# Patient Record
Sex: FEMALE | NOT HISPANIC OR LATINO | Employment: FULL TIME | URBAN - METROPOLITAN AREA
[De-identification: names, ages, dates, MRNs, and addresses within clinical notes are randomized per-mention and may not be internally consistent; named-entity substitution may affect disease eponyms.]

---

## 2022-04-22 NOTE — PATIENT INSTRUCTIONS
Preventive Health Recommendations  Female Ages 26 - 39  Yearly exam:   See your health care provider every year in order to  Review health changes.   Discuss preventive care.    Review your medicines if you your doctor has prescribed any.    Until age 30: Get a Pap test every three years (more often if you have had an abnormal result).    After age 30: Talk to your doctor about whether you should have a Pap test every 3 years or have a Pap test with HPV screening every 5 years.   You do not need a Pap test if your uterus was removed (hysterectomy) and you have not had cancer.  You should be tested each year for STDs (sexually transmitted diseases), if you're at risk.   Talk to your provider about how often to have your cholesterol checked.  If you are at risk for diabetes, you should have a diabetes test (fasting glucose).  Shots: Get a flu shot each year. Get a tetanus shot every 10 years.   Nutrition:   Eat at least 5 servings of fruits and vegetables each day.  Eat whole-grain bread, whole-wheat pasta and brown rice instead of white grains and rice.  Get adequate Calcium and Vitamin D.     Lifestyle  Exercise at least 150 minutes a week (30 minutes a day, 5 days of the week). This will help you control your weight and prevent disease.  Limit alcohol to one drink per day.  No smoking.   Wear sunscreen to prevent skin cancer.  See your dentist every six months for an exam and cleaning.    Thank you for coming in today!     We are working to improve our digital reputation.  Would you please help us by reviewing our clinic on Google and/or Facebook?  These are links for filling out a review for the clinic:    [email or web address]                 [email or web address]    We truly appreciate you taking the time to do this.     General Information:    Today you had your appointment with Sylvia Lombardo CNP  My hours are:    Monday 8 AM - 5 PM  Wednesday: 8 AM - 5  PM  Thursday: 8 AM - 5 PM  Fridays: 8 AM - 5 PM    I am not in the office Tuesdays. Therefore non urgent calls received on Tuesday will be addressed when I am back in the office on Wednesday.     If lab work was done today as part of your evaluation you will generally be contacted via My Chart, mail, or phone with the results within 1-5 days. If there is an alarming result we will contact you by phone. Lab results come back at varying times, I generally wait until all labs are resulted before making comments on results. Please note labs are automatically released to My Chart once available.     If you need refills please contact your pharmacy They will send a refill request to me to review. Please allow 3 business days for us to process all refill requests.     Please call or send a medical message with any questions or concerns

## 2022-04-22 NOTE — PROGRESS NOTES
ts SUBJECTIVE:   CC: Brigitte Shi is an 37 year old woman who presents for preventive health visit.     Patient has been advised of split billing requirements and indicates understanding: Yes  Healthy Habits:    Do you get at least three servings of calcium containing foods daily (dairy, green leafy vegetables, etc.)? no    Amount of exercise or daily activities, outside of work: 0 day(s) per week. Stretches but no aerobic exercise.     Problems taking medications regularly No    Medication side effects: No    Have you had an eye exam in the past two years? yes    Do you see a dentist twice per year? no    Do you have sleep apnea, excessive snoring or daytime drowsiness?no    Going [facility]"" 4/30-6/3. Periods were regular but now irregular. LMP 4/21 lasted 4 days. Before that had been 2 months. Used to have heavy bleeding but now lighter. Does not track periods on [place]. Wants to get pregnant. 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     Had covid-19 infection 1/23/22. Not vaccinated. She and her  do not want to get vaccine. Also has not ever gotten influenza vaccine.    Cold intolerance of extremities.     Today's PHQ-2 Score:   PHQ-2 ( 1999 Pfizer) 4/25/2022   Q1: Little interest or pleasure in doing things 0   Q2: Feeling down, depressed or hopeless 0   PHQ-2 Score 0       Abuse: Current or Past(Physical, Sexual or Emotional)- No  Do you feel safe in your environment? Yes    Have you ever done Advance Care Planning? (For example, a Health Directive, POLST, or a discussion with a medical provider or your loved ones about your wishes): No, advance care planning information given to patient to review.  Patient plans to discuss their wishes with loved ones or provider.    Patient under 40 years of age: Routine Mammogram Screening not recommended.   Pertinent mammograms are reviewed under the imaging tab.    History of abnormal Pap smear: NO - age 30-65 PAP every 5 years with negative HPV  "co-testing recommended    ROS:  CONSTITUTIONAL: NEGATIVE for fever, chills, change in weight  INTEGUMENTARU/SKIN: NEGATIVE for worrisome rashes, moles or lesions  EYES: NEGATIVE for vision changes or irritation  ENT: NEGATIVE for ear, mouth and throat problems  RESP: NEGATIVE for significant cough or SOB  BREAST: NEGATIVE for masses, tenderness or discharge  CV: NEGATIVE for chest pain, palpitations or peripheral edema  GI: NEGATIVE for nausea, abdominal pain, heartburn, or change in bowel habits  : NEGATIVE for unusual urinary or vaginal symptoms. Periods are regular.  MUSCULOSKELETAL: NEGATIVE for significant arthralgias or myalgia  NEURO: NEGATIVE for weakness, dizziness or paresthesias  ENDOCRINE: NEGATIVE for temperature intolerance, skin/hair changes  HEME/ALLERGY/IMMUNE: NEGATIVE for bleeding problems  PSYCHIATRIC: NEGATIVE for changes in mood or affect    OBJECTIVE:   /78   Pulse 70   Temp 97.9  F (36.6  C) (Temporal)   Resp 18   Ht 1.651 m (5' 5\")   Wt 61.9 kg (136 lb 6.4 oz)   LMP 04/21/2022   SpO2 98%   BMI 22.70 kg/m    EXAM:  GENERAL: healthy, alert and no distress  EYES: Eyes grossly normal to inspection, PERRL and conjunctivae and sclerae normal  HENT: ear canals and TM's normal, nose and mouth without ulcers or lesions  NECK: no adenopathy, no asymmetry, masses, or scars and thyroid normal to palpation  RESP: lungs clear to auscultation - no rales, rhonchi or wheezes  BREAST: normal without masses, tenderness or nipple discharge and no palpable axillary masses or adenopathy  CV: regular rate and rhythm, normal S1 S2, no S3 or S4, no murmur, click or rub, no peripheral edema and peripheral pulses strong  ABDOMEN: soft, nontender, no hepatosplenomegaly, no masses and bowel sounds normal   (female): female circumcision, normal urethral meatus , vaginal mucosa pink, moist, well rugated and normal cervix, adnexae, and uterus without masses.  MS: extremities normal- no gross deformities " "noted  SKIN: no suspicious lesions or rashes  NEURO: Normal strength and tone, mentation intact and speech normal  PSYCH: normal affect & mood    Diagnostic Test Results:  Labs reviewed in Epic  No results found for this or any previous visit (from the past 24 hour(s)).    ASSESSMENT/PLAN:   Brigitte was seen today for consult and physical.    Diagnoses and all orders for this visit:    Routine general medical examination at a health care facility  Age-appropriate preventative health maintenance along with diet, exercise and healthy weight discussed.    History of COVID-19  Comments:  Positive 1/23/22    Cold intolerance  -     TSH ([facility])  -     Ferritin  Serum ([facility])  -     Vitamin B12 ([facility])    Screening for diabetes mellitus  -     Comp. Metabolic Panel (14) ([facility])  -     VENOUS COLLECTION    Screening for lipid disorders  -     Lipid Profile ([facility])  -     VENOUS COLLECTION    Screening for endocrine, metabolic and immunity disorder  -     CBC with Diff/Plt ([facility])  -     VENOUS COLLECTION    Pap smear for cervical cancer screening  -     Pap IG HPV rfx HPV 16 and 18,45 ([facility])    COVID-19 vaccination declined  Influenza vaccination declined  Risks and benefits of Covid-19 & influenza vaccinations discussed. Patient declines at this time.     Encounter for HCV screening test for low risk patient  -     HCV Antibody ([facility])  -     VENOUS COLLECTION        Patient has been advised of split billing requirements and indicates understanding: Yes  COUNSELING:   Reviewed preventive health counseling, as reflected in patient instructions  Special attention given to:        Regular exercise       Healthy diet/nutrition       Vision screening       Family planning       Folic Acid    Estimated body mass index is 22.7 kg/m  as calculated from the following:    Height as of this encounter: 1.651 m (5' 5\").    Weight as of this encounter: 61.9 kg (136 lb 6.4 oz).        She reports that she has never smoked. She has " never used smokeless tobacco.    JAZLYN Royal [facility]

## 2022-04-25 ENCOUNTER — OFFICE VISIT (OUTPATIENT)
Dept: FAMILY MEDICINE | Facility: CLINIC | Age: 37
End: 2022-04-25

## 2022-04-25 VITALS
SYSTOLIC BLOOD PRESSURE: 114 MMHG | WEIGHT: 136.4 LBS | BODY MASS INDEX: 22.73 KG/M2 | OXYGEN SATURATION: 98 % | RESPIRATION RATE: 18 BRPM | HEART RATE: 70 BPM | DIASTOLIC BLOOD PRESSURE: 78 MMHG | HEIGHT: 65 IN | TEMPERATURE: 97.9 F

## 2022-04-25 DIAGNOSIS — Z11.59 ENCOUNTER FOR HCV SCREENING TEST FOR LOW RISK PATIENT: ICD-10-CM

## 2022-04-25 DIAGNOSIS — Z13.29 SCREENING FOR ENDOCRINE, METABOLIC AND IMMUNITY DISORDER: ICD-10-CM

## 2022-04-25 DIAGNOSIS — Z13.1 SCREENING FOR DIABETES MELLITUS: ICD-10-CM

## 2022-04-25 DIAGNOSIS — R68.89 COLD INTOLERANCE: ICD-10-CM

## 2022-04-25 DIAGNOSIS — Z28.21 COVID-19 VACCINATION DECLINED: ICD-10-CM

## 2022-04-25 DIAGNOSIS — Z00.00 ROUTINE GENERAL MEDICAL EXAMINATION AT A HEALTH CARE FACILITY: Primary | ICD-10-CM

## 2022-04-25 DIAGNOSIS — Z13.0 SCREENING FOR ENDOCRINE, METABOLIC AND IMMUNITY DISORDER: ICD-10-CM

## 2022-04-25 DIAGNOSIS — Z28.21 INFLUENZA VACCINATION DECLINED: ICD-10-CM

## 2022-04-25 DIAGNOSIS — Z86.16 HISTORY OF COVID-19: ICD-10-CM

## 2022-04-25 DIAGNOSIS — Z13.220 SCREENING FOR LIPID DISORDERS: ICD-10-CM

## 2022-04-25 DIAGNOSIS — Z13.228 SCREENING FOR ENDOCRINE, METABOLIC AND IMMUNITY DISORDER: ICD-10-CM

## 2022-04-25 DIAGNOSIS — Z12.4 PAP SMEAR FOR CERVICAL CANCER SCREENING: ICD-10-CM

## 2022-04-25 LAB
% GRANULOCYTES: 36.3 % (ref 42.2–75.2)
CHOL/HDL RATIO (RMG): 3.1 MG/DL (ref 0–4.5)
CHOLESTEROL: 150 MG/DL (ref 100–199)
HCT VFR BLD AUTO: 40.1 % (ref 35–46)
HDL (RMG): 48 MG/DL (ref 40–?)
HEMOGLOBIN: 13.1 G/DL (ref 11.8–15.5)
LDL CALCULATED (RMG): 93 MG/DL (ref 0–130)
LYMPHOCYTES NFR BLD AUTO: 55.6 % (ref 20.5–51.1)
MCH RBC QN AUTO: 29.7 PG (ref 27–31)
MCHC RBC AUTO-ENTMCNC: 32.8 G/DL (ref 33–37)
MCV RBC AUTO: 90.7 FL (ref 80–100)
MONOCYTES NFR BLD AUTO: 8.1 % (ref 1.7–9.3)
PLATELET # BLD AUTO: 344 K/UL (ref 140–450)
RBC # BLD AUTO: 4.42 X10/CMM (ref 3.7–5.2)
TRIGLYCERIDES (RMG): 46 MG/DL (ref 0–149)
WBC # BLD AUTO: 5 X10/CMM (ref 3.8–11)

## 2022-04-25 PROCEDURE — 36415 COLL VENOUS BLD VENIPUNCTURE: CPT | Performed by: NURSE PRACTITIONER

## 2022-04-25 PROCEDURE — 80061 LIPID PANEL: CPT | Mod: QW | Performed by: NURSE PRACTITIONER

## 2022-04-25 PROCEDURE — 85025 COMPLETE CBC W/AUTO DIFF WBC: CPT | Performed by: NURSE PRACTITIONER

## 2022-04-25 PROCEDURE — 99385 PREV VISIT NEW AGE 18-39: CPT | Performed by: NURSE PRACTITIONER

## 2022-04-25 NOTE — LETTER
[facility] [address] [place]  [address]  Phone: [phone]    May 2, 2022      Brigitte Shi  [address]   [facility] [place] [address]          Dear Brigitte,     It was so nice to meet you at your recent appointment!   I have summarized your lab results below.   Your pap smear showed normal cells of your cervix. The HPV (human papilloma virus) testing was positive but not for any high risk type. With this, we should repeat pap smear in 1 year.   Normal glucose level indicating no diabetes.   Normal kidney and liver function and electrolytes (complete metabolic panel) with minor abnormalities of no concern to you.   Normal thyroid testing (TSH)   Normal ferritin which is a marker for bone marrow iron stores   Normal vitamin B12 level.   Excellent lipids (cholesterol) and meeting target goals for your age and cardiovascular risk.   Negative hepatitis C testing   Normal complete blood count which includes a white count (WBC), hemoglobin (hgb), and platelets.  Minor abnormalities noted are of no concern.     Please let us know if you have questions or concerns.     Sincerely,   JAZLYN Royal CNP         Results for orders placed or performed in visit on 04/25/22   Lipid Profile ([facility])     Status: None   Result Value Ref Range    CHOLESTEROL 150 100 - 199 mg/dl    HDL 48 40 mg/dl    TRIGLYCERIDES ([facility]) 46 0 - 149 mg/dl    LDL CALCULATED ([facility]) 93 0 - 130 mg/dl    CHOL/HDL RATIO ([facility]) 3.1 0.0 - 4.5 mg/dl   Comp. Metabolic Panel (14) ([facility])     Status: Abnormal   Result Value Ref Range    Glucose 98 65 - 99 mg/dL    Urea Nitrogen 21 (H) 6 - 20 mg/dL    Creatinine 0.62 0.57 - 1.00 mg/dL    eGFR  118 >59 mL/min/1.73    BUN/Creatinine Ratio 34 (H) 9 - 23    Sodium 139 134 - 144 mmol/L    Potassium 4.1 3.5 - 5.2 mmol/L    Chloride 103 96 - 106 mmol/L    Total CO2 21 20 - 29 mmol/L    Calcium 9.2 8.7 - 10.2 mg/dL    Protein Total 7.5 6.0 - 8.5 g/dL    Albumin 4.6 3.8 - 4.8 g/dL     Globulin, Total 2.9 1.5 - 4.5 g/dL    A/G Ratio 1.6 1.2 - 2.2    Bilirubin Total 0.3 0.0 - 1.2 mg/dL    Alkaline Phosphatase 150 (H) 44 - 121 IU/L    AST 14 0 - 40 IU/L    ALT 12 0 - 32 IU/L    Narrative    Performed at:  01 - [facility] [address], [place], [place]  [id number]  : Reginlado Pan MD, Phone:  [phone]   CBC with Diff/Plt ([facility])     Status: Abnormal   Result Value Ref Range    WBC x10/cmm 5.0 3.8 - 11.0 x10/cmm    % Lymphocytes 55.6 (A) 20.5 - 51.1 %    % Monocytes 8.1 1.7 - 9.3 %    % Granulocytes 36.3 (A) 42.2 - 75.2 %    RBC x10/cmm 4.42 3.7 - 5.2 x10/cmm    Hemoglobin 13.1 11.8 - 15.5 g/dl    Hematocrit 40.1 35 - 46 %    MCV 90.7 80 - 100 fL    MCH 29.7 27.0 - 31.0 pg    MCHC 32.8 (A) 33.0 - 37.0 g/dL    Platelet Count 344 140 - 450 K/uL   Pap IG HPV rfx HPV 16 and 18,45 ([facility])     Status: Abnormal   Result Value Ref Range    DIAGNOSIS: Comment     Specimen adequacy: Comment     Source Comment     Performed by: Comment     Electronically signed by: Comment     . .     Note: Comment     Test Methodology: Comment     HPV Aptima Positive (A) Negative    HPV Genotype 16 Negative Negative    HPV Genotype 18,45 Negative Negative    Narrative    Performed at:  01 - [address]  [id number]  : Je Porras MD, Phone:  [phone]    Specimen Comment: Source.............Endocervix  Specimen Comment: LMP / Prev Treat...VIV=[id number];None  Specimen Comment: No. of containers..01 ThinPrep Vial   HCV Antibody ([facility])     Status: None   Result Value Ref Range    Hep C Virus Ab <0.1 0.0 - 0.9 s/co ratio    Narrative    Performed at:  01 - [facility] [address], [place], [place]  [id number]  : Reginaldo Pan MD, Phone:  [phone]   TSH ([facility])     Status: None   Result Value Ref Range    TSH 1.750 0.450 - 4.500 uIU/mL    Narrative    Performed at:  01 - [facility] [address], [place], [place]   [id number]  : Reginaldo Pan MD, Phone:  [phone]   Ferritin  Serum ([facility])     Status: None   Result Value Ref Range    Ferritin 91 15 - 150 ng/mL    Narrative    Performed at:  01 - [facility] [address], [place], [place]  [id number]  : Reginaldo Pan MD, Phone:  [phone]   Vitamin B12 ([facility])     Status: None   Result Value Ref Range    Vitamin B12 535 232 - 1,245 pg/mL    Narrative    Performed at:  01 - [facility] [address], [place], [place]  [id number]  : Reginaldo Pan MD, Phone:  [phone]

## 2022-04-26 LAB
ALBUMIN SERPL-MCNC: 4.6 G/DL (ref 3.8–4.8)
ALBUMIN/GLOB SERPL: 1.6 {RATIO} (ref 1.2–2.2)
ALP SERPL-CCNC: 150 IU/L (ref 44–121)
ALT SERPL-CCNC: 12 IU/L (ref 0–32)
AST SERPL-CCNC: 14 IU/L (ref 0–40)
BILIRUB SERPL-MCNC: 0.3 MG/DL (ref 0–1.2)
BUN SERPL-MCNC: 21 MG/DL (ref 6–20)
BUN/CREATININE RATIO: 34 (ref 9–23)
CALCIUM SERPL-MCNC: 9.2 MG/DL (ref 8.7–10.2)
CHLORIDE SERPLBLD-SCNC: 103 MMOL/L (ref 96–106)
CREAT SERPL-MCNC: 0.62 MG/DL (ref 0.57–1)
EGFR: 118 ML/MIN/1.73
FERRITIN SERPL-MCNC: 91 NG/ML (ref 15–150)
GLOBULIN, TOTAL: 2.9 G/DL (ref 1.5–4.5)
GLUCOSE SERPL-MCNC: 98 MG/DL (ref 65–99)
HCV AB SERPL QL IA: <0.1 S/CO RATIO (ref 0–0.9)
POTASSIUM SERPL-SCNC: 4.1 MMOL/L (ref 3.5–5.2)
PROT SERPL-MCNC: 7.5 G/DL (ref 6–8.5)
SODIUM SERPL-SCNC: 139 MMOL/L (ref 134–144)
TOTAL CO2: 21 MMOL/L (ref 20–29)
TSH BLD-ACNC: 1.75 UIU/ML (ref 0.45–4.5)
VIT B12 SERPL-MCNC: 535 PG/ML (ref 232–1245)

## 2022-04-29 LAB
.: ABNORMAL
DIAGNOSIS:: ABNORMAL
HPV APTIMA: POSITIVE
HPV GENOTYPE 18,45: NEGATIVE
HPV16 DNA CVX QL NAA+PROBE: NEGATIVE
Lab: ABNORMAL
Lab: ABNORMAL
PERFORMED BY:: ABNORMAL
SOURCE: ABNORMAL
SPECIMEN ADEQUACY:: ABNORMAL
TEST METHODOLOGY:: ABNORMAL